# Patient Record
(demographics unavailable — no encounter records)

---

## 2025-06-03 NOTE — HISTORY OF PRESENT ILLNESS
[Y] : Positive pregnancy history [Pregnancy History] : boy [___] : Delivery occurred at [unfilled] [TextBox_4] : 38yo  LMP 5/11/25 presents for an annual gyn exam.  She is complaining about depressed mood, sadness, hopelessness, sad and tearfulness and mood swings for 2 weeks each month.  This started when she was diagnosed with ADHD and was placed on Adderall.  She is already on Zoloft. She is s/p laparoscopic ovarian cystectomy 12/9/17.   [PapSmeardate] :  6/3/2025 [PGxTotal] : 1 [[facility]] : 1 [[facility] Hospital[facility]] : 1 [Currently Active] : currently active [Men] : men

## 2025-06-03 NOTE — PLAN
[FreeTextEntry1] : We discussed the trial of contraceptives as a mood stabilizer and birth control.  Risks of oral contraceptive pills including blood clots, stroke, DVT, breast cancer and gallbladder disease were reviewed.

## 2025-06-03 NOTE — PHYSICAL EXAM
[Chaperoned Physical Exam] : A chaperone was present in the examining room during all aspects of the physical examination. [MA] : MA [Examination Of The Breasts] : a normal appearance [No Masses] : no breast masses were palpable [Labia Majora] : normal [Labia Minora] : normal [Normal] : normal [Uterine Adnexae] : normal [FreeTextEntry2] : Karyna